# Patient Record
Sex: MALE | Race: WHITE | NOT HISPANIC OR LATINO
[De-identification: names, ages, dates, MRNs, and addresses within clinical notes are randomized per-mention and may not be internally consistent; named-entity substitution may affect disease eponyms.]

---

## 2019-08-25 ENCOUNTER — TRANSCRIPTION ENCOUNTER (OUTPATIENT)
Age: 24
End: 2019-08-25

## 2021-04-19 ENCOUNTER — TRANSCRIPTION ENCOUNTER (OUTPATIENT)
Age: 26
End: 2021-04-19

## 2021-04-27 ENCOUNTER — TRANSCRIPTION ENCOUNTER (OUTPATIENT)
Age: 26
End: 2021-04-27

## 2021-12-28 ENCOUNTER — TRANSCRIPTION ENCOUNTER (OUTPATIENT)
Age: 26
End: 2021-12-28

## 2021-12-30 ENCOUNTER — OUTPATIENT (OUTPATIENT)
Dept: OUTPATIENT SERVICES | Facility: HOSPITAL | Age: 26
LOS: 1 days | End: 2021-12-30

## 2021-12-30 ENCOUNTER — TRANSCRIPTION ENCOUNTER (OUTPATIENT)
Age: 26
End: 2021-12-30

## 2021-12-30 ENCOUNTER — APPOINTMENT (OUTPATIENT)
Dept: MRI IMAGING | Facility: CLINIC | Age: 26
End: 2021-12-30
Payer: COMMERCIAL

## 2021-12-30 DIAGNOSIS — Z00.8 ENCOUNTER FOR OTHER GENERAL EXAMINATION: ICD-10-CM

## 2021-12-30 PROCEDURE — 73721 MRI JNT OF LWR EXTRE W/O DYE: CPT | Mod: 26,RT

## 2022-01-03 ENCOUNTER — NON-APPOINTMENT (OUTPATIENT)
Age: 27
End: 2022-01-03

## 2022-01-03 ENCOUNTER — APPOINTMENT (OUTPATIENT)
Dept: CT IMAGING | Facility: CLINIC | Age: 27
End: 2022-01-03
Payer: COMMERCIAL

## 2022-01-03 ENCOUNTER — APPOINTMENT (OUTPATIENT)
Dept: ORTHOPEDIC SURGERY | Facility: CLINIC | Age: 27
End: 2022-01-03
Payer: COMMERCIAL

## 2022-01-03 ENCOUNTER — OUTPATIENT (OUTPATIENT)
Dept: OUTPATIENT SERVICES | Facility: HOSPITAL | Age: 27
LOS: 1 days | End: 2022-01-03
Payer: COMMERCIAL

## 2022-01-03 DIAGNOSIS — S99.921A UNSPECIFIED INJURY OF RIGHT FOOT, INITIAL ENCOUNTER: ICD-10-CM

## 2022-01-03 DIAGNOSIS — M79.671 PAIN IN RIGHT FOOT: ICD-10-CM

## 2022-01-03 PROCEDURE — 73700 CT LOWER EXTREMITY W/O DYE: CPT | Mod: 26,RT

## 2022-01-03 PROCEDURE — 73700 CT LOWER EXTREMITY W/O DYE: CPT

## 2022-01-03 PROCEDURE — 99204 OFFICE O/P NEW MOD 45 MIN: CPT

## 2022-01-03 PROCEDURE — 73630 X-RAY EXAM OF FOOT: CPT | Mod: 50

## 2022-01-03 NOTE — HISTORY OF PRESENT ILLNESS
[FreeTextEntry1] : 1/3/2022: The patient is a 26 year old male presenting with his parents for an initial evaluation of right foot injury sustained on 12/28/2021 while playing racquetball. The patient then rolled his ankle and landed on his foot while jumping to hit the ball. Directly after the injury, the patient felt an immediate onset of pain and swelling to the Lisfranc articulation. He has been nonweightbearing since the DOI. The patient was evaluated for this issue by [facility], who obtained XR films and diagnosed the patient with a Lisfranc injury. He was then evaluated for this issue by an outside Podiatrist who referred the patient to the office today.  He obtained an MRI of the right foot and presents today for further evaluation. The patient presents ambulating with crutches and is wearing a tall CAM boot. Pain scale 4/10, improved since date of injury. He has a medical history of Osteochondromas. No other complaints. \par

## 2022-01-03 NOTE — PHYSICAL EXAM
[de-identified] : General: Alert and oriented x3. In no acute distress. Pleasant in nature with a normal affect. No apparent respiratory distress.\par \par Right Foot Exam\par Skin: Clean, dry, intact\par Inspection: No obvious malalignment, no masses, no swelling, no effusion\par Pulses: 2+ DP/PT pulses\par ROM: FOOT Full  ROM of digits, ANKLE 10 degrees of dorsiflexion, 40 degrees of plantarflexion, 10 degrees of subtalar motion.\par Painful ROM: None\par Tenderness: + Tenderness over the 1st TMT. + Tenderness over the 2nd TMT. + Tenderness over the Lisfranc Articulation. No tenderness over the medial malleolus, No tenderness over the lateral malleolus, no CFL/ATFL/PTFL pain, no deltoid ligament pain. No heel pain. No Achilles tenderness. No 5th metatarsal pain No ttp over the posterior tibial tendon.\par Stability: Negative anterior/posterior drawer.\par Strength: 5/5 ADD/ABD/TA/GS/EHL/FHL/EDL\par Neuro: Sensation in tact to light touch throughout\par Additional tests: Negative Mortons test, negative tarsal tunnel tinels, negative single heel rise.			\par  [de-identified] : Stress weightbearing x-ray of the bilateral feet were ordered, obtained, and reviewed by me today, 01/03/2022, revealed:  History of Osteochondromas. Right foot 3 mm gap noted. \par \par \par \par EXAM: MR ANKLE RT\par \par \par PROCEDURE DATE: 12/30/2021\par \par \par \par INTERPRETATION: RIGHT ANKLE MRI\par \par CLINICAL INFORMATION: Foot pain and swelling. Lisfranc injury.\par TECHNIQUE: Multiplanar, multisequence MRI was obtained of the right ankle.\par \par FINDINGS:\par \par \par LIGAMENTS: Complete tear of the Lisfranc ligament complex. Capsular avulsion of the dorsal second tarsometatarsal ligament sprain of the third and fourth dorsal tarsometatarsal ligaments.\par MUSCLES AND TENDONS: Medial and lateral flexor tendons are intact. Anterior extensors are intact. No muscle atrophy. There is surrounding reactive muscle edema.\par CARTILAGE and SUBCHONDRAL BONE: Articular surfaces are intact.\par SYNOVIUM/JOINT FLUID: No joint effusion or synovitis.\par MARROW: There are contusions versus nondisplaced fractures of the medial cuneiform, the distal aspect of the intermediate cuneiform, the base of the second metatarsal and the distal aspect of the lateral cuneiform. Sessile osseous excrescences are present along the lateral aspects of the distal tibial metadiaphysis and along the medial aspect of the distal fibular metadiaphysis, suggestive of a sessile osteochondromas.\par PLANTAR FASCIA: Plantar fascia is intact.\par NEUROVASCULAR STRUCTURES: Course of the neurovascular structures are unremarkable.\par SINUS TARSI: Fat within the sinus Tarsi is maintained.\par PERIPHERAL/ SUB-CUTANEOUS SOFT TISSUES: Dorsal subcutaneous edema along the midfoot.\par \par IMPRESSION:\par Complete tear of the Lisfranc ligament complex. 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 Dec 30 2021 7:37PM\par \par \par Xrays of right foot obtained from [facility] on 12/28/2021 and reviewed in the office today, 01/03/2022 , revealed:\par \par Impressions: Acute mildly displaced comminuted fracture of the medial base of the second metatarsal with extension of the fracture lines into the second tarsometatarsal joint and into the region of the footprint of the Lisfranc ligament. Linear area of mineralization along the lateral aspect of t medial cuneiform in the region of the Lisfranc ligamentous complex, concerning for an avulsion fracture. There is mild widening of the Lisfranc interval, concerning for underlying Lisfranc ligamentous complex injury. \par \par Curvilinear lucency within the base of the fourth metatarsal seen best on the lateral view, which may be related to a vascular channel or be related to an acute fracture. \par \par Remaining joint spaces are unremarkable. \par \par

## 2022-01-03 NOTE — REASON FOR VISIT
[Initial Visit] : an initial visit for [Parent] : parent [FreeTextEntry2] : Right foot Lisfranc ligament rupture

## 2022-01-03 NOTE — ADDENDUM
[FreeTextEntry1] : I, Felisa Robison, acted solely as a scribe for Dr. Curtis Graham on this date 01/03/2022.\par \par All medical record entries made by the Scribe were at my, Dr. Curtis Graham, direction and personally dictated by me on 01/03/2022 . I have reviewed the chart and agree that the record accurately reflects my personal performance of the history, physical exam, assessment and plan. I have also personally directed, reviewed, and agreed with the chart.	\par

## 2022-01-04 ENCOUNTER — NON-APPOINTMENT (OUTPATIENT)
Age: 27
End: 2022-01-04

## 2022-01-04 ENCOUNTER — APPOINTMENT (OUTPATIENT)
Dept: ORTHOPEDIC SURGERY | Facility: HOSPITAL | Age: 27
End: 2022-01-04
Payer: COMMERCIAL

## 2022-01-04 PROCEDURE — 99442: CPT

## 2022-01-10 ENCOUNTER — APPOINTMENT (OUTPATIENT)
Dept: ORTHOPEDIC SURGERY | Facility: CLINIC | Age: 27
End: 2022-01-10
Payer: COMMERCIAL

## 2022-01-10 PROCEDURE — 73630 X-RAY EXAM OF FOOT: CPT | Mod: RT

## 2022-01-10 PROCEDURE — 99214 OFFICE O/P EST MOD 30 MIN: CPT

## 2022-01-10 NOTE — HISTORY OF PRESENT ILLNESS
[FreeTextEntry1] : 1/10/2022: The patient is a 26 year old male presenting with his parents for a follow-up evaluation of right foot injury and fractures. His pain scale 4/10. The patient does state that his swelling has improved since his last evaluation. He presents to discuss further conservative treatment interventions. He continues with the  mg for DVT Prophylaxis. The patient presents wearing a CAM boot and has been NWB since his last evaluation. He is ambulating with crutches.  No other complaints. \par \par 1/3/2022: The patient is a 26 year old male presenting with his parents for an initial evaluation of right foot injury sustained on 12/28/2021 while playing [employer]. The patient then rolled his ankle and landed on his foot while jumping to hit the ball. Directly after the injury, the patient felt an immediate onset of pain and swelling to the Lisfranc articulation. He has been nonweightbearing since the DOI. The patient was evaluated for this issue by [facility], who obtained XR films and diagnosed the patient with a Lisfranc injury. He was then evaluated for this issue by an outside Podiatrist who referred the patient to the office today.  He obtained an MRI of the right foot and presents today for further evaluation. The patient presents ambulating with crutches and is wearing a tall CAM boot. Pain scale 4/10, improved since date of injury. He has a medical history of Osteochondromas. No other complaints. \par

## 2022-01-10 NOTE — DISCUSSION/SUMMARY
[de-identified] : Today I had a lengthy discussion with the patient and his parents regarding their right foot injury, Lisfranc fracture and metatarsal fractures. I have addressed all the patient's concerns surrounding the pathology of their condition. CT results were reviewed with the patient today in the clinic. XR imaging was completed in office today and results were reviewed with the patient.Further, we discussed both operative and nonoperative treatment options in the office. I recommended that the patient utilize a CAM boot. He should remain nonweightbearing in the CAM boot. The patient was educated about the boot wear pattern and utilization, as well as the timeframe to come out of the boot. He was also given full instructions for using the boot. I advised the patient to continue to utilize 325 mg of Aspirin as instructed for DVT Prophylaxis. He may continue to utilize the crutches for ambulation assistance PRN. I recommend that the patient utilize ice, NSAIDs, and heat PRN. They can also elevate their right foot above the level of the heart. The patient may begin gentle ROM of the ankle and he he wiggle his toes as tolerated. The patient understood and verbally agreed to the treatment plan. All of their questions were answered and they were satisfied with the visit. The patient should call the office if they have any questions or experience worsening symptoms. I would like to see the patient back in the office in 3 weeks to reassess their condition and to obtain new weightbearing x-rays.  				\par

## 2022-01-10 NOTE — PHYSICAL EXAM
[de-identified] : General: Alert and oriented x3. In no acute distress. Pleasant in nature with a normal affect. No apparent respiratory distress.\par \par Right Foot Exam\par Skin: Clean, dry, intact\par Inspection: No obvious malalignment, no masses, no swelling, no effusion\par Pulses: 2+ DP/PT pulses\par ROM: FOOT Full  ROM of digits, ANKLE 10 degrees of dorsiflexion, 40 degrees of plantarflexion, 10 degrees of subtalar motion.\par Painful ROM: None\par Tenderness: + Tenderness over the 1st TMT. + Tenderness over the 2nd TMT. + Tenderness over the Lisfranc Articulation. No tenderness over the medial malleolus, No tenderness over the lateral malleolus, no CFL/ATFL/PTFL pain, no deltoid ligament pain. No heel pain. No Achilles tenderness. No 5th metatarsal pain No ttp over the posterior tibial tendon.\par Stability: Negative anterior/posterior drawer.\par Strength: 5/5 ADD/ABD/TA/GS/EHL/FHL/EDL\par Neuro: Sensation in tact to light touch throughout\par Additional tests: Negative Mortons test, negative tarsal tunnel tinels, negative single heel rise.			\par  [de-identified] : 3V of the right foot were ordered, obtained, and reviewed by me today, 01/10/2022, revealed: Stable x-rays. \par \par Stress weightbearing x-ray of the bilateral feet were  reviewed by me today, 01/10/2022, revealed:  History of Osteochondromas. Right foot 3 mm gap noted. \par \par \par EXAM:  CT FOOT ONLY RT\par PROCEDURE DATE:  01/03/2022\par \par \par \par INTERPRETATION:  INDICATION:  Lisfranc injury.\par \par TECHNIQUE: CT of the right foot without contrast with axial, sagittal, and coronal multiplanar reformats.\par \par Comparison:Correlation with made with ankle MRI dated 12/30/2021\par \par FINDINGS:\par \par Comminuted mildly displaced intra-articular fracture of the plantar aspect of the second metatarsal base with extension of fracture lines into the region of the Lisfranc ligamentous complex. Mild widening of the Lisfranc interval. Avulsion fractures along the lateral aspect of the medial cuneiform in the region of the Lisfranc ligament footprint.\par \par Avulsion fracture versus chip fracture along the dorsal base of the first metatarsal.\par \par Avulsion fracture/chip fracture along the dorsolateral aspect of the lateral cuneiform and along the lateral aspect of the intermediate cuneiform.\par \par Comminuted, mildly displaced intra-articular fracture of the base of fourth metatarsal.\par \par Multiple exostoses partially evaluated along the distal tibia/fibula, incompletely evaluated.\par \par Soft tissue swelling about the ankle/foot.\par \par IMPRESSION:\par \par Comminuted mildly displaced intra-articular fracture of the base of the second metatarsal with extension of fracture lines into the region of the Lisfranc ligamentous complex. Avulsion fractures along the lateral aspect of the medial cuneiform in the region of the Lisfranc ligament footprint. Mild widening of the Lisfranc joint. Please refer to prior ankle MRI regarding further findings of the patient's Lisfranc ligament injury.\par \par Avulsion/chip fractures along the cuneiform bones and along the dorsal base of the first metatarsal, described above.\par \par Comminuted mildly displaced intra-articular fracture base of fourth metatarsal.\par \par Incompletely evaluated multiple probable exostoses along the distal tibia/fibula\par \par --- End of Report ---\par \par \par FE VILLAFANA M.D., ATTENDING RADIOLOGIST\par This document has been electronically signed. Dmitriy  3 2022  4:50PM

## 2022-01-10 NOTE — ADDENDUM
[FreeTextEntry1] : I, Felisa Robison, acted solely as a scribe for Dr. Curtis Graham on this date 01/10/2022.\par \par All medical record entries made by the Scribe were at my, Dr. Curtis Graham, direction and personally dictated by me on 01/10/2022 . I have reviewed the chart and agree that the record accurately reflects my personal performance of the history, physical exam, assessment and plan. I have also personally directed, reviewed, and agreed with the chart.	\par

## 2022-01-10 NOTE — REASON FOR VISIT
[Parent] : parent [Follow-Up Visit] : a follow-up visit for [FreeTextEntry2] : Right foot Lisfranc ligament rupture

## 2022-01-31 ENCOUNTER — APPOINTMENT (OUTPATIENT)
Dept: ORTHOPEDIC SURGERY | Facility: CLINIC | Age: 27
End: 2022-01-31
Payer: COMMERCIAL

## 2022-01-31 PROCEDURE — 99213 OFFICE O/P EST LOW 20 MIN: CPT

## 2022-01-31 PROCEDURE — 73630 X-RAY EXAM OF FOOT: CPT | Mod: RT

## 2022-01-31 NOTE — REASON FOR VISIT
[Follow-Up Visit] : a follow-up visit for [Parent] : parent [FreeTextEntry2] : Right foot Lisfranc ligament rupture

## 2022-01-31 NOTE — PHYSICAL EXAM
[de-identified] : General: Alert and oriented x3. In no acute distress. Pleasant in nature with a normal affect. No apparent respiratory distress.\par \par Right Foot Exam\par Skin: Clean, dry, intact\par Inspection: No obvious malalignment, no masses, no swelling, no effusion\par Pulses: 2+ DP/PT pulses\par ROM: FOOT Full  ROM of digits, ANKLE 10 degrees of dorsiflexion, 40 degrees of plantarflexion, 10 degrees of subtalar motion.\par Painful ROM: None\par Tenderness: + Improved tenderness over the 1st TMT. + Improved Tenderness over the 2nd TMT. + Improved Tenderness over the Lisfranc Articulation. No tenderness over the medial malleolus, No tenderness over the lateral malleolus, no CFL/ATFL/PTFL pain, no deltoid ligament pain. No heel pain. No Achilles tenderness. No 5th metatarsal pain No ttp over the posterior tibial tendon.\par Stability: Negative anterior/posterior drawer.\par Strength: 5/5 ADD/ABD/TA/GS/EHL/FHL/EDL\par Neuro: Sensation in tact to light touch throughout\par Additional tests: Negative Mortons test, negative tarsal tunnel tinels, negative single heel rise.			\par  [de-identified] : 3V of the right foot were ordered, obtained, and reviewed by me today, 01/31/2022, revealed: Callus formation noted to the closed fractures of the first, second, and fourth metatarsal bone with healing. Stable x-rays.

## 2022-01-31 NOTE — ADDENDUM
[FreeTextEntry1] : I, Felisa Robison, acted solely as a scribe for Dr. Curtis Graham on this date 01/31/2022.\par \par All medical record entries made by the Scribe were at my, Dr. Curtis Graham, direction and personally dictated by me on 01/31/2022 . I have reviewed the chart and agree that the record accurately reflects my personal performance of the history, physical exam, assessment and plan. I have also personally directed, reviewed, and agreed with the chart.	\par

## 2022-01-31 NOTE — HISTORY OF PRESENT ILLNESS
[FreeTextEntry1] : 1/31/2022: The patient is a 26 year old male presenting with his mother for a follow-up evaluation of right foot injury and fractures. The patient presents ambulating with crutches and is wearing a tall CAM boot. He continues with the  mg for DVT Prophylaxis. He is concerned today with waxing and waning swelling to the right foot that is relieved after periods of elevation. He denies any pain to the fracture site but notes compensatory ankle pain due to CAM boot utilization. DIANA denies any numbness or tingling sensations to the foot or ankle. No other complaints. \par \par 1/10/2022: The patient is a 26 year old male presenting with his parents for a follow-up evaluation of right foot injury and fractures. His pain scale 4/10. The patient does state that his swelling has improved since his last evaluation. He presents to discuss further conservative treatment interventions. He continues with the  mg for DVT Prophylaxis. The patient presents wearing a CAM boot and has been NWB since his last evaluation. He is ambulating with crutches.  No other complaints. \par \par 1/3/2022: The patient is a 26 year old male presenting with his parents for an initial evaluation of right foot injury sustained on 12/28/2021 while playing [employer]. The patient then rolled his ankle and landed on his foot while jumping to hit the ball. Directly after the injury, the patient felt an immediate onset of pain and swelling to the Lisfranc articulation. He has been nonweightbearing since the DOI. The patient was evaluated for this issue by [facility], who obtained XR films and diagnosed the patient with a Lisfranc injury. He was then evaluated for this issue by an outside Podiatrist who referred the patient to the office today.  He obtained an MRI of the right foot and presents today for further evaluation. The patient presents ambulating with crutches and is wearing a tall CAM boot. Pain scale 4/10, improved since date of injury. He has a medical history of Osteochondromas. No other complaints. \par

## 2022-01-31 NOTE — DISCUSSION/SUMMARY
[de-identified] : Today I had a lengthy discussion with the patient and his mother regarding their right foot injury, Lisfranc fracture. I have addressed all the patient's concerns surrounding the pathology of their condition. XR imaging was completed in office today and results were reviewed with the patient. I recommend the patient undergo a course of physical therapy for the right foot 2-3 times a week for a total of 6-8 weeks. A prescription was given for the physical therapy today. With the guidance of physical therapy, I recommended that the patient may begin to WBAT in the CAM boot. He should continue to utilize the Aspirin 325 mg for DVT Prophylaxis while in the CAM boot. He may begin to wean out of the crutches as tolerated with physical therapy. Further, I recommended that the patient begin to transition out of the CAM boot to an stiff, supportive sneaker with the guidance of physical therapy. The patient understood and verbally agreed to the treatment plan. All of their questions were answered and they were satisfied with the visit. The patient should call the office if they have any questions or experience worsening symptoms. I would like to see the patient back in the office in 6 weeks to reassess their condition. 				\par

## 2022-03-07 ENCOUNTER — APPOINTMENT (OUTPATIENT)
Dept: ORTHOPEDIC SURGERY | Facility: CLINIC | Age: 27
End: 2022-03-07
Payer: COMMERCIAL

## 2022-03-07 PROCEDURE — 99213 OFFICE O/P EST LOW 20 MIN: CPT

## 2022-03-07 NOTE — DISCUSSION/SUMMARY
[de-identified] : Today I had a lengthy discussion with the patient regarding their right foot injury, s/p Lisfranc fracture. I have addressed all the patient's concerns surrounding the pathology of their condition. At this time, the patient is cleared for his ski trip this upcoming week. We discussed that the patient should restrict high impact activities for the next 6 weeks. He will continue with conservative management at this time.The patient understood and verbally agreed to the treatment plan. All of their questions were answered and they were satisfied with the visit. The patient should call the office if they have any questions or experience worsening symptoms. I would like to see the patient back in the office in 3-6 months PRN to reassess their condition. 				\par

## 2022-03-07 NOTE — HISTORY OF PRESENT ILLNESS
[FreeTextEntry1] : 3/7/2022: The patient is a 26 year old male presenting with his mother for a follow-up evaluation of right foot injury. He reports he is 80% improved from prior visit. He reports pain over the foot at a 3/10 worse with increased activities. He takes occasional Aleve with improvement in symptoms. He is doing PT regularly and progressing well. He is out of the cam boot and in regular sneakers today. Denies any numbness or paresthesia in the foot. No other complaints. \par \par 1/31/2022: The patient is a 26 year old male presenting with his mother for a follow-up evaluation of right foot injury and fractures. The patient presents ambulating with crutches and is wearing a tall CAM boot. He continues with the  mg for DVT Prophylaxis. He is concerned today with waxing and waning swelling to the right foot that is relieved after periods of elevation. He denies any pain to the fracture site but notes compensatory ankle pain due to CAM boot utilization. DIANA denies any numbness or tingling sensations to the foot or ankle. No other complaints. \par \par 1/10/2022: The patient is a 26 year old male presenting with his parents for a follow-up evaluation of right foot injury and fractures. His pain scale 4/10. The patient does state that his swelling has improved since his last evaluation. He presents to discuss further conservative treatment interventions. He continues with the  mg for DVT Prophylaxis. The patient presents wearing a CAM boot and has been NWB since his last evaluation. He is ambulating with crutches.  No other complaints. \par \par 1/3/2022: The patient is a 26 year old male presenting with his parents for an initial evaluation of right foot injury sustained on 12/28/2021 while playing [employer]. The patient then rolled his ankle and landed on his foot while jumping to hit the ball. Directly after the injury, the patient felt an immediate onset of pain and swelling to the Lisfranc articulation. He has been nonweightbearing since the DOI. The patient was evaluated for this issue by [facility], who obtained XR films and diagnosed the patient with a Lisfranc injury. He was then evaluated for this issue by an outside Podiatrist who referred the patient to the office today.  He obtained an MRI of the right foot and presents today for further evaluation. The patient presents ambulating with crutches and is wearing a tall CAM boot. Pain scale 4/10, improved since date of injury. He has a medical history of Osteochondromas. No other complaints. \par

## 2022-03-07 NOTE — PHYSICAL EXAM
[de-identified] : General: Alert and oriented x3. In no acute distress. Pleasant in nature with a normal affect. No apparent respiratory distress.\par \par Right Foot Exam\par Skin: Clean, dry, intact\par Inspection: No obvious malalignment, no masses, no swelling, no effusion\par Pulses: 2+ DP/PT pulses\par ROM: FOOT Full  ROM of digits, ANKLE 10 degrees of dorsiflexion, 40 degrees of plantarflexion, 10 degrees of subtalar motion.\par Painful ROM: None\par Tenderness: + Improved tenderness over the 1st TMT. + Improved Tenderness over the 2nd TMT. + Improved Tenderness over the Lisfranc Articulation. No tenderness over the medial malleolus, No tenderness over the lateral malleolus, no CFL/ATFL/PTFL pain, no deltoid ligament pain. No heel pain. No Achilles tenderness. No 5th metatarsal pain No ttp over the posterior tibial tendon.\par Stability: Negative anterior/posterior drawer.\par Strength: 5/5 ADD/ABD/TA/GS/EHL/FHL/EDL\par Neuro: Sensation in tact to light touch throughout\par Additional tests: Negative Mortons test, negative tarsal tunnel tinels, negative single heel rise.			\par  [de-identified] : 3V of the right foot were reviewed by me today, 03/07/2022, revealed: Callus formation noted to the closed fractures of the first, second, and fourth metatarsal bone with healing. Stable x-rays.

## 2022-03-07 NOTE — ADDENDUM
[FreeTextEntry1] : I, Felisa Robison, acted solely as a scribe for Dr. Curtis Graham on this date 03/07/2022.\par \par All medical record entries made by the Scribe were at my, Dr. Curtis Graham, direction and personally dictated by me on 03/07/2022 . I have reviewed the chart and agree that the record accurately reflects my personal performance of the history, physical exam, assessment and plan. I have also personally directed, reviewed, and agreed with the chart.	\par

## 2022-07-18 ENCOUNTER — APPOINTMENT (OUTPATIENT)
Dept: ORTHOPEDIC SURGERY | Facility: CLINIC | Age: 27
End: 2022-07-18

## 2022-07-18 PROCEDURE — 99441: CPT

## 2022-08-21 ENCOUNTER — NON-APPOINTMENT (OUTPATIENT)
Age: 27
End: 2022-08-21

## 2022-09-12 ENCOUNTER — APPOINTMENT (OUTPATIENT)
Dept: ORTHOPEDIC SURGERY | Facility: CLINIC | Age: 27
End: 2022-09-12

## 2022-09-12 PROCEDURE — 73630 X-RAY EXAM OF FOOT: CPT | Mod: RT

## 2022-09-12 PROCEDURE — 99214 OFFICE O/P EST MOD 30 MIN: CPT

## 2022-09-12 NOTE — DISCUSSION/SUMMARY
[de-identified] : Today I had a lengthy discussion with the patient regarding their right foot pain. I have addressed all the patient's concerns surrounding the pathology of their condition. XR imaging was completed in office today and results were reviewed with the patient. A discussion was had about shoe-wear modifications. I advised the patient to utilize a wide toed cross training sneaker that better accommodates the feet. Advised against walking barefoot. I did recommend to the patient to utilize inserts in his shoes. The patient understood and verbally agreed to the treatment plan. All of their questions were answered and they were satisfied with the visit. The patient should call the office if they have any questions or experience worsening symptoms. I would like to see the patient back in the office in 3 months to reassess their condition. If there is no improvement after the next 3 months we discussed obtaining an MRI for further assessment. 	\par

## 2022-09-12 NOTE — ADDENDUM
[FreeTextEntry1] : I, Felisa Robison, acted solely as a scribe for Dr. Curtis Graham on this date 09/12/2022.\par \par All medical record entries made by the Scribe were at my, Dr. Curtis Graham, direction and personally dictated by me on 09/12/2022. I have reviewed the chart and agree that the record accurately reflects my personal performance of the history, physical exam, assessment and plan. I have also personally directed, reviewed, and agreed with the chart.	\par

## 2022-09-12 NOTE — PHYSICAL EXAM
[de-identified] : General: Alert and oriented x3. In no acute distress. Pleasant in nature with a normal affect. No apparent respiratory distress.\par \par Right Foot Exam\par Skin: Osteochondroma over the middle and proximal tibia. Osteochondroma over the fibula. Clean, dry, intact\par Inspection: No obvious malalignment, no masses, no swelling, no effusion\par Pulses: 2+ DP/PT pulses\par ROM: FOOT Full  ROM of digits, ANKLE 10 degrees of dorsiflexion, 40 degrees of plantarflexion, 10 degrees of subtalar motion.\par Painful ROM: None\par Tenderness: No tenderness over the medial malleolus, No tenderness over the lateral malleolus, no CFL/ATFL/PTFL pain, no deltoid ligament pain. No heel pain. No Achilles tenderness. No 5th metatarsal pain. No pain to the LisFranc joint. No ttp over the posterior tibial tendon.\par Stability: Negative anterior/posterior drawer.\par Strength: 5/5 ADD/ABD/TA/GS/EHL/FHL/EDL\par Neuro: Sensation in tact to light touch throughout\par Additional tests: Negative Mortons test, negative tarsal tunnel tinels, negative single heel rise.			 [de-identified] : 3V of the right foot were ordered, obtained, and reviewed by me today, 09/12/2022, revealed: Deformity noted due to history of osteochondromas.

## 2023-03-28 ENCOUNTER — NON-APPOINTMENT (OUTPATIENT)
Age: 28
End: 2023-03-28

## 2023-03-30 ENCOUNTER — APPOINTMENT (OUTPATIENT)
Dept: ORTHOPEDIC SURGERY | Facility: CLINIC | Age: 28
End: 2023-03-30
Payer: COMMERCIAL

## 2023-03-30 VITALS
DIASTOLIC BLOOD PRESSURE: 79 MMHG | HEART RATE: 63 BPM | SYSTOLIC BLOOD PRESSURE: 144 MMHG | WEIGHT: 145 LBS | BODY MASS INDEX: 24.16 KG/M2 | HEIGHT: 65 IN | OXYGEN SATURATION: 98 %

## 2023-03-30 PROCEDURE — 99213 OFFICE O/P EST LOW 20 MIN: CPT

## 2023-03-30 PROCEDURE — 73610 X-RAY EXAM OF ANKLE: CPT | Mod: LT

## 2023-03-30 PROCEDURE — 72170 X-RAY EXAM OF PELVIS: CPT

## 2023-03-30 PROCEDURE — 73100 X-RAY EXAM OF WRIST: CPT | Mod: 50

## 2023-03-30 NOTE — DATA REVIEWED
[Imaging Present] : Present [de-identified] : X-rays today multiple views of bilateral wrist show some osteochondromas kissing between the right both bones and something off of the left radius.  The ulnas are approximately normal height.\par \par AP pelvis shows an osteochondroma off of the left pubic body towards the symphysis.  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  On the medial side there is small pedunculated lesions of 1 or 2 cm.  There is some soft tissue seen.

## 2023-03-30 NOTE — PHYSICAL EXAM
[FreeTextEntry1] : On exam the patient stands in good balance.  He has overall good alignment of his ankle.  He has some swelling approximately 3 cm anteromedially along the distal tibia.  He is able to move around appropriately with good range of motion.  There is mass is ballotable mildly tender and mobile and not fixed down to bone.  He otherwise has multiple bony bumps around his body but no areas of pain. [General Appearance - Well-Appearing] : Well appearing [General Appearance - Well Nourished] : well nourished [Oriented To Time, Place, And Person] : Oriented to person, place, and time [Impaired Insight] : Insight and judgment were intact [Affect] : The affect was normal. [Mood] : the mood was normal [Sclera] : the sclera and conjunctiva were normal [Neck Cervical Mass (___cm)] : no neck mass was observed [Heart Rate And Rhythm] : heart rate was normal and rhythm regular [] : No respiratory distress [Abdomen Soft] : Soft [Normal Station and Gait] : gait and station were normal [Tenderness] : tenderness [Swelling] : swelling [Masses] : masses [Skin Changes - Describe changes:] : No skin changes noted [Normal] : No palpable lymph nodes in the inguinal and popliteal beds [Full ROM Unless otherwise noted:] : Full range of motion unless otherwise noted: [LE  Motor Strength Normal unless otherwise noted:] : 5/5 strength in bilateral lower extemities unless otherwise noted.

## 2023-03-30 NOTE — HISTORY OF PRESENT ILLNESS
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  He has some mild decreased rotation in his right wrist and known problems elsewhere.  He has been complaining approximately 2 to 3 months of an ankle mass medially along the distal tibia.  He says he skis a lot and this is happened after skiing.  It occasionally causes pain.  He has not had any erythema. [Stable] : stable [1] : currently ~his/her~ pain is 1 out of 10

## 2023-03-30 NOTE — DISCUSSION/SUMMARY
[All Questions Answered] : Patient (and family) had all questions answered to an agreeable level of satisfaction [Interested in Proceeding] : Patient (and family) expressed understanding and interest in proceeding with the plan as outlined [de-identified] : Patient has a new soft tissue mass along an area of small osteochondroma.  This could be a bursa exostotic from his ski boot however I do need to rule out any degeneration although this is less likely at his age.  My recommendation is to start with anti-inflammatory cream as well as MRI scan with and without contrast to rule out bursitis versus chondrosarcoma.  If this is a bursa we can offer aspiration and injection.  I can see him back again after imaging.\par \par If imaging was ordered, the patient was told to make an appointment to review findings right after all imaging is completed.\par \par We discussed risks, benefits and alternatives. Rationale of care was reviewed and all questions were answered. Patient (and family) had all questions answered to her degree of the level of satisfaction. Patient (and family) expressed understanding and interest in proceeding with the plan as outlined.\par \par \par \par \par This note was done with a voice recognition transcription software and any typos are related to this rather than medical error. Surgical risks reviewed. Patient (and family) had all questions answered to an agreeable level of satisfaction. Patient (and family) expressed understanding and interest in proceeding with the plan as outlined.  \par

## 2023-04-07 ENCOUNTER — APPOINTMENT (OUTPATIENT)
Dept: MRI IMAGING | Facility: CLINIC | Age: 28
End: 2023-04-07
Payer: COMMERCIAL

## 2023-04-07 ENCOUNTER — OUTPATIENT (OUTPATIENT)
Dept: OUTPATIENT SERVICES | Facility: HOSPITAL | Age: 28
LOS: 1 days | End: 2023-04-07
Payer: COMMERCIAL

## 2023-04-07 DIAGNOSIS — M25.572 PAIN IN LEFT ANKLE AND JOINTS OF LEFT FOOT: ICD-10-CM

## 2023-04-07 DIAGNOSIS — Z00.8 ENCOUNTER FOR OTHER GENERAL EXAMINATION: ICD-10-CM

## 2023-04-07 PROCEDURE — A9585: CPT

## 2023-04-07 PROCEDURE — 73723 MRI JOINT LWR EXTR W/O&W/DYE: CPT

## 2023-04-07 PROCEDURE — 73723 MRI JOINT LWR EXTR W/O&W/DYE: CPT | Mod: 26,LT

## 2023-05-04 ENCOUNTER — APPOINTMENT (OUTPATIENT)
Dept: ORTHOPEDIC SURGERY | Facility: CLINIC | Age: 28
End: 2023-05-04
Payer: COMMERCIAL

## 2023-05-04 VITALS
HEART RATE: 63 BPM | OXYGEN SATURATION: 96 % | SYSTOLIC BLOOD PRESSURE: 122 MMHG | HEIGHT: 65 IN | WEIGHT: 145 LBS | BODY MASS INDEX: 24.16 KG/M2 | DIASTOLIC BLOOD PRESSURE: 74 MMHG

## 2023-05-04 PROCEDURE — 99213 OFFICE O/P EST LOW 20 MIN: CPT

## 2023-05-04 NOTE — DATA REVIEWED
[de-identified] : MRI April 7, 2023 shows:\par IMPRESSION:\par \par 1.  Complex 2.4 x 1.1 x 2.8 cm superficial subcutaneous collection along the anteromedial margin of left distal shin. Differential includes organizing hematoma and adventitial bursitis.\par 2.  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 No chondrosarcoma.\par

## 2023-05-04 NOTE — PHYSICAL EXAM
[FreeTextEntry1] : On exam the patient stands in good balance.  He has overall good alignment of his ankle.  He has significantly less swelling in the area of concern.  He still has a bony bump with minimal bursa on top of it and no pain.  He is able to move around appropriately with good range of motion.   He otherwise has multiple bony bumps around his body but no areas of pain. [General Appearance - Well-Appearing] : Well appearing [General Appearance - Well Nourished] : well nourished [Oriented To Time, Place, And Person] : Oriented to person, place, and time [Impaired Insight] : Insight and judgment were intact [Affect] : The affect was normal. [Mood] : the mood was normal [Sclera] : the sclera and conjunctiva were normal [Neck Cervical Mass (___cm)] : no neck mass was observed [Heart Rate And Rhythm] : heart rate was normal and rhythm regular [] : No respiratory distress [Abdomen Soft] : Soft [Normal Station and Gait] : gait and station were normal [Tenderness] : tenderness [Swelling] : swelling [Masses] : masses [Skin Changes - Describe changes:] : No skin changes noted [Normal] : No palpable lymph nodes in the inguinal and popliteal beds [Full ROM Unless otherwise noted:] : Full range of motion unless otherwise noted: [LE  Motor Strength Normal unless otherwise noted:] : 5/5 strength in bilateral lower extemities unless otherwise noted.

## 2023-05-04 NOTE — HISTORY OF PRESENT ILLNESS
[Improving] : improving [0] : currently ~his/her~ pain is 0 out of 10 [FreeTextEntry1] : Patient is doing better at this point.  He has no pain with it.  He thinks that the swelling is gone down considerably. [Bending] : not exacerbated by bending [Direct Pressure] : not exacerbated by direct pressure

## 2023-05-04 NOTE — REVIEW OF SYSTEMS
[Joint Pain] : joint pain [Joint Swelling] : joint swelling [Feeling Tired] : not feeling tired [Joint Stiffness] : no joint stiffness

## 2023-05-04 NOTE — DISCUSSION/SUMMARY
[All Questions Answered] : Patient (and family) had all questions answered to an agreeable level of satisfaction [Interested in Proceeding] : Patient (and family) expressed understanding and interest in proceeding with the plan as outlined [de-identified] : Still seems most consistent with a bursa over an osteochondroma.  He is improving.  My recommendation is to let him be and to follow this.  He understands may happen again with skiing especially now that this happened the first time it is likely to happen again.  I can see him again as needed.  We can follow-up for multiple hereditary exostosis at any point.\par \par If imaging was ordered, the patient was told to make an appointment to review findings right after all imaging is completed.\par \par We discussed risks, benefits and alternatives. Rationale of care was reviewed and all questions were answered. Patient (and family) had all questions answered to her degree of the level of satisfaction. Patient (and family) expressed understanding and interest in proceeding with the plan as outlined.\par \par \par \par \par This note was done with a voice recognition transcription software and any typos are related to this rather than medical error. Surgical risks reviewed. Patient (and family) had all questions answered to an agreeable level of satisfaction. Patient (and family) expressed understanding and interest in proceeding with the plan as outlined.  \par

## 2023-05-04 NOTE — REASON FOR VISIT
[FreeTextEntry1] : Left ankle pain with history of multiple hereditary exostosis, follow-up MRI scan patient is doing better.  He thinks it is gotten significantly smaller.  He has no pain with it anymore.

## 2024-06-07 ENCOUNTER — APPOINTMENT (OUTPATIENT)
Dept: FAMILY MEDICINE | Facility: CLINIC | Age: 29
End: 2024-06-07
Payer: COMMERCIAL

## 2024-06-07 VITALS
HEART RATE: 66 BPM | SYSTOLIC BLOOD PRESSURE: 122 MMHG | OXYGEN SATURATION: 97 % | TEMPERATURE: 98.7 F | DIASTOLIC BLOOD PRESSURE: 80 MMHG | BODY MASS INDEX: 25.83 KG/M2 | HEIGHT: 65 IN | WEIGHT: 155 LBS

## 2024-06-07 DIAGNOSIS — Z78.9 OTHER SPECIFIED HEALTH STATUS: ICD-10-CM

## 2024-06-07 DIAGNOSIS — Z13.21 ENCOUNTER FOR SCREENING FOR NUTRITIONAL DISORDER: ICD-10-CM

## 2024-06-07 DIAGNOSIS — S92.321A DISPLACED FRACTURE OF SECOND METATARSAL BONE, RIGHT FOOT, INITIAL ENCOUNTER FOR CLOSED FRACTURE: ICD-10-CM

## 2024-06-07 DIAGNOSIS — S99.921A UNSPECIFIED INJURY OF RIGHT FOOT, INITIAL ENCOUNTER: ICD-10-CM

## 2024-06-07 DIAGNOSIS — Z23 ENCOUNTER FOR IMMUNIZATION: ICD-10-CM

## 2024-06-07 DIAGNOSIS — Z60.2 PROBLEMS RELATED TO LIVING ALONE: ICD-10-CM

## 2024-06-07 DIAGNOSIS — M79.671 PAIN IN RIGHT FOOT: ICD-10-CM

## 2024-06-07 DIAGNOSIS — Z13.29 ENCOUNTER FOR SCREENING FOR OTHER SUSPECTED ENDOCRINE DISORDER: ICD-10-CM

## 2024-06-07 DIAGNOSIS — S92.341A DISPLACED FRACTURE OF FOURTH METATARSAL BONE, RIGHT FOOT, INITIAL ENCOUNTER FOR CLOSED FRACTURE: ICD-10-CM

## 2024-06-07 DIAGNOSIS — Z13.1 ENCOUNTER FOR SCREENING FOR DIABETES MELLITUS: ICD-10-CM

## 2024-06-07 DIAGNOSIS — Z13.220 ENCOUNTER FOR SCREENING FOR LIPOID DISORDERS: ICD-10-CM

## 2024-06-07 DIAGNOSIS — Q78.6 MULTIPLE CONGENITAL EXOSTOSES: ICD-10-CM

## 2024-06-07 DIAGNOSIS — Z00.00 ENCOUNTER FOR GENERAL ADULT MEDICAL EXAMINATION W/OUT ABNORMAL FINDINGS: ICD-10-CM

## 2024-06-07 DIAGNOSIS — M25.572 PAIN IN LEFT ANKLE AND JOINTS OF LEFT FOOT: ICD-10-CM

## 2024-06-07 DIAGNOSIS — S92.311A DISPLACED FRACTURE OF FIRST METATARSAL BONE, RIGHT FOOT, INITIAL ENCOUNTER FOR CLOSED FRACTURE: ICD-10-CM

## 2024-06-07 PROCEDURE — 99385 PREV VISIT NEW AGE 18-39: CPT

## 2024-06-07 SDOH — SOCIAL STABILITY - SOCIAL INSECURITY: PROBLEMS RELATED TO LIVING ALONE: Z60.2

## 2024-06-07 NOTE — HEALTH RISK ASSESSMENT
[Very Good] : ~his/her~  mood as very good [Yes] : Yes [Monthly or less (1 pt)] : Monthly or less (1 point) [1 or 2 (0 pts)] : 1 or 2 (0 points) [Never (0 pts)] : Never (0 points) [No] : In the past 12 months have you used drugs other than those required for medical reasons? No [0] : 2) Feeling down, depressed, or hopeless: Not at all (0) [PHQ-2 Negative - No further assessment needed] : PHQ-2 Negative - No further assessment needed [Audit-CScore] : 1 [de-identified] : regularly [de-identified] : healthy [[id number]] : 0 [HIV test declined] : HIV test declined [Hepatitis C test declined] : Hepatitis C test declined [Alone] : lives alone [Single] : single [Sexually Active] : not sexually active [High Risk Behavior] : no high risk behavior [Fully functional (bathing, dressing, toileting, transferring, walking, feeding)] : Fully functional (bathing, dressing, toileting, transferring, walking, feeding) [Fully functional (using the telephone, shopping, preparing meals, housekeeping, doing laundry, using] : Fully functional and needs no help or supervision to perform IADLs (using the telephone, shopping, preparing meals, housekeeping, doing laundry, using transportation, managing medications and managing finances) [Reports changes in hearing] : Reports no changes in hearing [Reports changes in vision] : Reports no changes in vision [Reports changes in dental health] : Reports no changes in dental health [Never] : Never

## 2024-06-07 NOTE — PHYSICAL EXAM
[Normal] : affect was normal and insight and judgment were intact [de-identified] : bone lumps, scattered, below right knee, b/l ankles/wrists

## 2024-06-07 NOTE — HISTORY OF PRESENT ILLNESS
[FreeTextEntry1] : DIANA is a 28-year-old male here for a NP-CPE [de-identified] : 27 yo male presents for annual physical. Reports feeling well. Denies fever, chills, cp, palpitations, sob, nvcd.

## 2024-06-07 NOTE — ASSESSMENT
[FreeTextEntry1] : Annual physical: f/u routine labwork Exostosis: multiple sites, f/u orthopedist RTC 3 wks

## 2024-06-08 DIAGNOSIS — E78.5 HYPERLIPIDEMIA, UNSPECIFIED: ICD-10-CM

## 2024-06-08 DIAGNOSIS — E55.9 VITAMIN D DEFICIENCY, UNSPECIFIED: ICD-10-CM

## 2024-06-08 LAB
25(OH)D3 SERPL-MCNC: 29.2 NG/ML
ALBUMIN SERPL ELPH-MCNC: 4.5 G/DL
ALP BLD-CCNC: 78 U/L
ALT SERPL-CCNC: 16 U/L
ANION GAP SERPL CALC-SCNC: 12 MMOL/L
APPEARANCE: CLEAR
AST SERPL-CCNC: 18 U/L
BACTERIA: NEGATIVE /HPF
BASOPHILS # BLD AUTO: 0.05 K/UL
BASOPHILS NFR BLD AUTO: 0.7 %
BILIRUB SERPL-MCNC: 0.3 MG/DL
BILIRUBIN URINE: NEGATIVE
BLOOD URINE: NEGATIVE
BUN SERPL-MCNC: 10 MG/DL
CALCIUM SERPL-MCNC: 10 MG/DL
CAST: 0 /LPF
CHLORIDE SERPL-SCNC: 105 MMOL/L
CHOLEST SERPL-MCNC: 175 MG/DL
CO2 SERPL-SCNC: 24 MMOL/L
COLOR: YELLOW
CREAT SERPL-MCNC: 1.04 MG/DL
EGFR: 102 ML/MIN/1.73M2
EOSINOPHIL # BLD AUTO: 0.07 K/UL
EOSINOPHIL NFR BLD AUTO: 1 %
EPITHELIAL CELLS: 0 /HPF
ESTIMATED AVERAGE GLUCOSE: 97 MG/DL
GLUCOSE QUALITATIVE U: NEGATIVE MG/DL
GLUCOSE SERPL-MCNC: 98 MG/DL
HBA1C MFR BLD HPLC: 5 %
HCT VFR BLD CALC: 49.1 %
HDLC SERPL-MCNC: 55 MG/DL
HGB BLD-MCNC: 16.1 G/DL
IMM GRANULOCYTES NFR BLD AUTO: 0.3 %
KETONES URINE: NEGATIVE MG/DL
LDLC SERPL CALC-MCNC: 109 MG/DL
LEUKOCYTE ESTERASE URINE: NEGATIVE
LYMPHOCYTES # BLD AUTO: 1.93 K/UL
LYMPHOCYTES NFR BLD AUTO: 27.5 %
MAN DIFF?: NORMAL
MCHC RBC-ENTMCNC: 29 PG
MCHC RBC-ENTMCNC: 32.8 GM/DL
MCV RBC AUTO: 88.3 FL
MICROSCOPIC-UA: NORMAL
MONOCYTES # BLD AUTO: 0.3 K/UL
MONOCYTES NFR BLD AUTO: 4.3 %
NEUTROPHILS # BLD AUTO: 4.66 K/UL
NEUTROPHILS NFR BLD AUTO: 66.2 %
NITRITE URINE: NEGATIVE
NONHDLC SERPL-MCNC: 120 MG/DL
PH URINE: 6
PLATELET # BLD AUTO: 264 K/UL
POTASSIUM SERPL-SCNC: 4.7 MMOL/L
PROT SERPL-MCNC: 6.7 G/DL
PROTEIN URINE: NEGATIVE MG/DL
RBC # BLD: 5.56 M/UL
RBC # FLD: 14.1 %
RED BLOOD CELLS URINE: 0 /HPF
SODIUM SERPL-SCNC: 141 MMOL/L
SPECIFIC GRAVITY URINE: 1.02
TRIGL SERPL-MCNC: 55 MG/DL
TSH SERPL-ACNC: 1.39 UIU/ML
UROBILINOGEN URINE: 0.2 MG/DL
WBC # FLD AUTO: 7.03 K/UL
WHITE BLOOD CELLS URINE: 0 /HPF

## 2024-06-08 RX ORDER — MULTIVIT-MIN/FOLIC/VIT K/LYCOP 400-300MCG
25 MCG TABLET ORAL DAILY
Qty: 90 | Refills: 0 | Status: ACTIVE | COMMUNITY
Start: 2024-06-08

## 2024-06-17 ENCOUNTER — NON-APPOINTMENT (OUTPATIENT)
Age: 29
End: 2024-06-17

## 2025-07-07 ENCOUNTER — APPOINTMENT (OUTPATIENT)
Dept: ORTHOPEDIC SURGERY | Facility: CLINIC | Age: 30
End: 2025-07-07
Payer: COMMERCIAL

## 2025-07-07 VITALS — WEIGHT: 150 LBS | HEIGHT: 65 IN | BODY MASS INDEX: 24.99 KG/M2

## 2025-07-07 PROCEDURE — 73630 X-RAY EXAM OF FOOT: CPT | Mod: RT

## 2025-07-07 PROCEDURE — 99214 OFFICE O/P EST MOD 30 MIN: CPT

## 2025-07-09 ENCOUNTER — APPOINTMENT (OUTPATIENT)
Dept: FAMILY MEDICINE | Facility: CLINIC | Age: 30
End: 2025-07-09
Payer: COMMERCIAL

## 2025-07-09 VITALS
BODY MASS INDEX: 24.99 KG/M2 | WEIGHT: 150 LBS | HEART RATE: 77 BPM | HEIGHT: 65 IN | SYSTOLIC BLOOD PRESSURE: 120 MMHG | DIASTOLIC BLOOD PRESSURE: 78 MMHG | OXYGEN SATURATION: 98 %

## 2025-07-09 PROCEDURE — 99395 PREV VISIT EST AGE 18-39: CPT

## 2025-07-15 LAB
ALBUMIN SERPL ELPH-MCNC: 4.7 G/DL
ALP BLD-CCNC: 65 U/L
ALT SERPL-CCNC: 16 U/L
ANION GAP SERPL CALC-SCNC: 13 MMOL/L
APPEARANCE: CLEAR
AST SERPL-CCNC: 24 U/L
BACTERIA: NEGATIVE /HPF
BASOPHILS # BLD AUTO: 0.05 K/UL
BASOPHILS NFR BLD AUTO: 0.8 %
BILIRUB SERPL-MCNC: 1 MG/DL
BILIRUBIN URINE: NEGATIVE
BLOOD URINE: NEGATIVE
BUN SERPL-MCNC: 15 MG/DL
CALCIUM SERPL-MCNC: 9.9 MG/DL
CAST: 0 /LPF
CHLORIDE SERPL-SCNC: 104 MMOL/L
CHOLEST SERPL-MCNC: 195 MG/DL
CO2 SERPL-SCNC: 23 MMOL/L
COLOR: YELLOW
CREAT SERPL-MCNC: 1.01 MG/DL
EGFRCR SERPLBLD CKD-EPI 2021: 105 ML/MIN/1.73M2
EOSINOPHIL # BLD AUTO: 0.02 K/UL
EOSINOPHIL NFR BLD AUTO: 0.3 %
EPITHELIAL CELLS: 0 /HPF
ESTIMATED AVERAGE GLUCOSE: 97 MG/DL
GLUCOSE QUALITATIVE U: NEGATIVE MG/DL
GLUCOSE SERPL-MCNC: 89 MG/DL
HBA1C MFR BLD HPLC: 5 %
HCT VFR BLD CALC: 47.3 %
HDLC SERPL-MCNC: 60 MG/DL
HGB BLD-MCNC: 16.1 G/DL
IMM GRANULOCYTES NFR BLD AUTO: 0 %
KETONES URINE: NEGATIVE MG/DL
LDLC SERPL-MCNC: 121 MG/DL
LEUKOCYTE ESTERASE URINE: NEGATIVE
LYMPHOCYTES # BLD AUTO: 2.64 K/UL
LYMPHOCYTES NFR BLD AUTO: 41.3 %
MAN DIFF?: NORMAL
MCHC RBC-ENTMCNC: 29.1 PG
MCHC RBC-ENTMCNC: 34 G/DL
MCV RBC AUTO: 85.4 FL
MICROSCOPIC-UA: NORMAL
MONOCYTES # BLD AUTO: 0.37 K/UL
MONOCYTES NFR BLD AUTO: 5.8 %
NEUTROPHILS # BLD AUTO: 3.31 K/UL
NEUTROPHILS NFR BLD AUTO: 51.8 %
NITRITE URINE: NEGATIVE
NONHDLC SERPL-MCNC: 136 MG/DL
PH URINE: 5.5
PLATELET # BLD AUTO: 242 K/UL
POTASSIUM SERPL-SCNC: 4.4 MMOL/L
PROT SERPL-MCNC: 6.8 G/DL
PROTEIN URINE: NEGATIVE MG/DL
RBC # BLD: 5.54 M/UL
RBC # FLD: 13.3 %
RED BLOOD CELLS URINE: 0 /HPF
SODIUM SERPL-SCNC: 140 MMOL/L
SPECIFIC GRAVITY URINE: 1.02
TRIGL SERPL-MCNC: 78 MG/DL
TSH SERPL-ACNC: 1.64 UIU/ML
UROBILINOGEN URINE: 0.2 MG/DL
WBC # FLD AUTO: 6.39 K/UL
WHITE BLOOD CELLS URINE: 0 /HPF